# Patient Record
(demographics unavailable — no encounter records)

---

## 2020-05-18 NOTE — ROOR
________________________________________________________________________________

Patient Name: Jak Woods            Procedure Date: 5/18/2020 2:29 PM

MRN: [id number]                          Account Number: [id number]

YOB: 1991               Age: 29

Room: [facility]                            Gender: Male

Note Status: Finalized                 

________________________________________________________________________________

 

Procedure:           Upper Endoscopy + Biopsies

Indications:         Epigastric abdominal pain, Abdominal pain in the right 

                     upper quadrant

Providers:           Garry Alfredo MD

Referring MD:        BROCK DAVILA MD

Requesting Provider: 

Medicines:           Monitored Anesthesia Care

Complications:       No immediate complications.

________________________________________________________________________________

Procedure:           Pre-Anesthesia Assessment:

                     - The heart rate, respiratory rate, oxygen saturations, 

                     blood pressure, adequacy of pulmonary ventilation, and 

                     response to care were monitored throughout the procedure.

                     The Endoscope was introduced through the mouth, and 

                     advanced to the second part of duodenum. The upper GI 

                     endoscopy was accomplished without difficulty. The 

                     patient tolerated the procedure well.

                                                                                

Findings:

     The Z-line was variable and was found 40 cm from the incisors. Multiple 

     biopsies were obtained with cold forceps for evaluation to rule out 

     Gonzalez's Esophagus randomly at the gastroesophageal junction.

     No other significant abnormalities were identified in a careful 

     examination of the stomach.

     Biopsies were taken with a cold forceps in the gastric antrum for 

     Helicobacter pylori testing.

     The exam of the duodenum was otherwise normal.

                                                                                

Impression:          - Z-line variable, 40 cm from the incisors.

                     - Multiple biopsies were obtained at the gastroesophageal 

                     junction.

                     - Biopsies were taken with a cold forceps for 

                     Helicobacter pylori testing.

                     - The examination was otherwise normal.

Recommendation:      - Patient has a contact number available for emergencies. 

                     The signs and symptoms of potential delayed complications 

                     were discussed with the patient. Return to normal 

                     activities tomorrow. Written discharge instructions were 

                     provided to the patient.

                     - High fiber diet.

                     - Discharge patient to home.

                     - Continue present medications.

                     - Await pathology results.

                     - Telephone GI clinic for pathology results in 1 week.

                     - Return to referring physician.

                     - The findings and recommendations were discussed with 

                     the patient's family.

                                                                                

 

Garry Alfredo MD

____________________

Garry Alfredo MD

5/18/2020 2:44:52 PM

Electronically signed by Garry Alfredo MD

Number of Addenda: 0

 

Note Initiated On: 5/18/2020 2:29 PM

Estimated Blood Loss:

     Estimated blood loss: none.

## 2020-06-16 NOTE — REP
Hepatobiliary scan and gallbladder ejection fraction:

 

History:  Upper abdominal pain.

 

Technique:  6.6 mCi of technetium-99m mebrofenin was injected and sequential

anterior images are acquired.  65 minutes after the mebrofenin injection, the

patient consumed 8 ounces Ensure and an additional 60 minutes of imaging was

acquired.  Regions of interest are plotted around the gallbladder.

 

Findings:  The initial hepatocellular parenchymal uptake phase is normal and

homogeneous.  Intra- and extra-hepatic bile ducts are labeled by the 10 minute

image.  The gallbladder is first labeled on the 10 -minute image.  There is

normal washout from the liver parenchyma into the gallbladder and small intestine

on subsequent images.  The gallbladder ejection fraction is 94 %. Values greater

than 35 % are considered normal with this technique.

 

Impression:

 

Normal hepatobiliary scan and normal gallbladder ejection fraction.

 

 

Electronically Signed by

Silas Richmond MD 06/16/2020 01:15 P